# Patient Record
Sex: FEMALE | Race: BLACK OR AFRICAN AMERICAN
[De-identification: names, ages, dates, MRNs, and addresses within clinical notes are randomized per-mention and may not be internally consistent; named-entity substitution may affect disease eponyms.]

---

## 2020-08-21 ENCOUNTER — HOSPITAL ENCOUNTER (OUTPATIENT)
Dept: HOSPITAL 61 - LAB | Age: 75
Discharge: HOME | End: 2020-08-21
Attending: SURGERY
Payer: MEDICARE

## 2020-08-21 DIAGNOSIS — Z01.818: Primary | ICD-10-CM

## 2020-08-21 DIAGNOSIS — R22.40: ICD-10-CM

## 2020-08-21 DIAGNOSIS — Z11.59: ICD-10-CM

## 2020-08-25 ENCOUNTER — HOSPITAL ENCOUNTER (OUTPATIENT)
Dept: HOSPITAL 61 - SURG | Age: 75
Discharge: HOME | End: 2020-08-25
Attending: SURGERY
Payer: MEDICARE

## 2020-08-25 VITALS — HEIGHT: 61 IN | WEIGHT: 182.98 LBS | BODY MASS INDEX: 34.55 KG/M2

## 2020-08-25 VITALS — SYSTOLIC BLOOD PRESSURE: 131 MMHG | DIASTOLIC BLOOD PRESSURE: 79 MMHG

## 2020-08-25 DIAGNOSIS — Z88.5: ICD-10-CM

## 2020-08-25 DIAGNOSIS — Z88.0: ICD-10-CM

## 2020-08-25 DIAGNOSIS — Z88.2: ICD-10-CM

## 2020-08-25 DIAGNOSIS — Z98.890: ICD-10-CM

## 2020-08-25 DIAGNOSIS — R22.41: Primary | ICD-10-CM

## 2020-08-25 DIAGNOSIS — Z79.899: ICD-10-CM

## 2020-08-25 PROCEDURE — A7015 AEROSOL MASK USED W NEBULIZE: HCPCS

## 2020-08-25 PROCEDURE — 88304 TISSUE EXAM BY PATHOLOGIST: CPT

## 2020-08-25 PROCEDURE — 11406 EXC TR-EXT B9+MARG >4.0 CM: CPT

## 2020-08-25 NOTE — PDOC4
Operative Note


Operative Note


Operative Note:





Preoperative Diagnosis: Right posterior thigh mass





Postoperative Diagnosis: Same





Procedure: Excision of right posterior thigh mass





Surgeon: Cisco





Assistant:  Maribel ALDRICH





Anesthesia: General





EBL: 10 mL





Specimen: Posterior thigh mass to pathology, 10 x 6 cm





Drains: None





Complications: None





Indication: The patient is a 75-year-old female presented with an enlarging 

posterior thigh mass for which she requests excision.  The risks of surgery were

discussed which include bleeding, infection, recurrence, pain, anesthetic risk, 

potential need for additional surgery procedure.  She understands and would like

to proceed





Description: The patient was taken to the operating room and placed supine on 

the operating table.  General anesthesia was performed.  She was then placed 

prone.  The right posterior thigh was prepped with ChloraPrep and draped in a 

standard surgical manner.  An elliptical incision was made around the mass which

showed some protrusion and stretching of the skin.  Cautery dissection was 

carried down subcutaneous tissues.  The mass represented a large lipoma with 

multiple lobules.  This was fully freed up from the surrounding tissues with a 

combination of cautery and blunt dissection.  The mass measured 10 x 6 cm and 

was sent to pathology for evaluation.  Hemostasis was achieved with cautery.  

The subcutaneous tissue was approximated with 3-0 Vicryl.  The skin was closed 

with 4-0 Monocryl.  Steri-Strips and a sterile dressing were applied.  The 

patient tolerated the procedure well and was sent to the recovery room in stable

condition.  At the end of the case all counts were correct.











HEMANTH MILLER MD             Aug 25, 2020 08:41

## 2020-08-25 NOTE — DISCH
DISCHARGE INSTRUCTIONS


Condition on Discharge


Condition on Discharge:  Stable





Activity After Discharge


Activity Instructions for Disc:  Resume previous activity





Diet after Discharge


Diet after Discharge:  Regular





Wound Incision Care


Wound/Incision Care:  Other, see below (keep dressing clean and dry X 72 hours, 

may then remove and shower)





Follow-Up


Follow up with:  Dr Miller in 2 weeks in office, call for appt [phone]











HEMANTH MILLER MD             Aug 25, 2020 08:45

## 2020-08-27 NOTE — PATHOLOGY
[facility] Accession Number: [id number]

.                                                                01

Material submitted:                                        .

thigh - RIGHT POSTERIOR THIGH MASS. Modifiers: right, posterior

.                                                                01

Clinical history:                                          .

RIGHT POSTERIOR THIGH MASS

.                                                                02

**********************************************************************

Diagnosis:

Skin and soft tissue "right posterior thigh mass", excision:

 - Mature lobulated adipose tissue, compatible with lipoma.

 - Unremarkable skin.

(MLK:jossie; 08/26/2020)

MBR  08/27/2020  1124 Local

**********************************************************************

.                                                                02

Electronically signed:                                     .

Nehemias Wakefield MD, Pathologist

NPI- [id number]

.                                                                01

Gross description:                                         .

The specimen is received in formalin, labeled "Lara Guthrie, right

posterior thigh mass".  Received are multiple segments of yellow-tan

lobulated tissue measuring 10.8 x 9.7 x 3.9 cm in aggregate dimensions.

There is an attached ellipse of light brown skin measuring 3.2 x 1.3 cm.

Sectioning reveals bright yellow cut surfaces with no grossly distinct

nodules or lesions.  The specimen is submitted representatively in

cassette A1 and A2.

([facility]; 8/25/2020)

QA/QA  08/25/2020  1654 Local

.                                                                02

Pathologist provided ICD-10:

D17.23

.                                                                02

CPT                                                        .

[id number]

Specimen Comment: A courtesy copy of this report has been sent to [phone], [phone]

Specimen Comment: [id number]

Specimen Comment: Report sent to  / DR PERRY

***Performed at:  01

   [facility]

   [address], [place]  [id number]

   MD Hang Judge MD Phone:  [phone]

***Performed at:  02

   [facility]

   [address] [place], [place]  [id number]

   MD Miguel Jason MD Phone:  [phone]